# Patient Record
Sex: FEMALE | Race: WHITE | Employment: UNEMPLOYED | URBAN - METROPOLITAN AREA
[De-identification: names, ages, dates, MRNs, and addresses within clinical notes are randomized per-mention and may not be internally consistent; named-entity substitution may affect disease eponyms.]

---

## 2021-10-04 ENCOUNTER — OFFICE VISIT (OUTPATIENT)
Dept: ENDOCRINOLOGY | Age: 36
End: 2021-10-04
Payer: COMMERCIAL

## 2021-10-04 VITALS
OXYGEN SATURATION: 96 % | BODY MASS INDEX: 35.82 KG/M2 | HEART RATE: 81 BPM | HEIGHT: 65 IN | WEIGHT: 215 LBS | DIASTOLIC BLOOD PRESSURE: 81 MMHG | SYSTOLIC BLOOD PRESSURE: 115 MMHG

## 2021-10-04 DIAGNOSIS — E04.9 GOITER: ICD-10-CM

## 2021-10-04 DIAGNOSIS — R53.83 FATIGUE, UNSPECIFIED TYPE: ICD-10-CM

## 2021-10-04 DIAGNOSIS — E88.81 INSULIN RESISTANCE: ICD-10-CM

## 2021-10-04 DIAGNOSIS — E03.9 HYPOTHYROIDISM, UNSPECIFIED TYPE: Primary | ICD-10-CM

## 2021-10-04 PROCEDURE — 99203 OFFICE O/P NEW LOW 30 MIN: CPT | Performed by: INTERNAL MEDICINE

## 2021-10-04 RX ORDER — LEVOTHYROXINE SODIUM 175 MCG
175 TABLET ORAL DAILY
Qty: 30 TABLET | Refills: 3 | Status: SHIPPED | OUTPATIENT
Start: 2021-10-04 | End: 2022-02-28

## 2021-10-04 RX ORDER — BUPROPION HYDROCHLORIDE 300 MG/1
300 TABLET ORAL DAILY
COMMUNITY
End: 2022-06-08

## 2021-10-04 RX ORDER — LEVOTHYROXINE, LIOTHYRONINE 9.5; 2.25 UG/1; UG/1
15 TABLET ORAL DAILY
COMMUNITY
Start: 2021-09-24 | End: 2021-10-04 | Stop reason: ALTCHOICE

## 2021-10-04 RX ORDER — LEVOTHYROXINE, LIOTHYRONINE 57; 13.5 UG/1; UG/1
90 TABLET ORAL DAILY
COMMUNITY
Start: 2021-09-23 | End: 2021-10-04 | Stop reason: ALTCHOICE

## 2021-10-04 RX ORDER — ESCITALOPRAM OXALATE 10 MG/1
10 TABLET ORAL DAILY
COMMUNITY
Start: 2021-07-26 | End: 2022-06-08

## 2021-10-04 RX ORDER — VALACYCLOVIR HYDROCHLORIDE 500 MG/1
500 TABLET, FILM COATED ORAL DAILY PRN
COMMUNITY
Start: 2021-08-22 | End: 2022-10-18 | Stop reason: SDUPTHER

## 2021-10-04 ASSESSMENT — ENCOUNTER SYMPTOMS: CONSTIPATION: 1

## 2021-10-04 NOTE — PROGRESS NOTES
10/4/2021    Assessment:       Diagnosis Orders   1. Hypothyroidism, unspecified type  TSH with Reflex    T4, Free   2. Goiter  US HEAD NECK SOFT TISSUE THYROID   3. Insulin resistance  Insulin, Total   4.  Fatigue, unspecified type  Cortisol Total         PLAN:     Orders Placed This Encounter   Procedures    US HEAD NECK SOFT TISSUE THYROID     Standing Status:   Future     Standing Expiration Date:   10/4/2022    TSH with Reflex     Standing Status:   Future     Standing Expiration Date:   10/4/2022    T4, Free     Standing Status:   Future     Standing Expiration Date:   10/4/2022    Cortisol Total     Standing Status:   Future     Standing Expiration Date:   10/4/2022     Order Specific Question:   8AM or 4PM?     Answer:   random    Insulin, Total     Standing Status:   Future     Standing Expiration Date:   10/4/2022     Increased dose of Synthroid to 175 mcg daily  More than 50% of 30-minute spent patient education counseling thank you for the referral  Orders Placed This Encounter   Medications    SYNTHROID 175 MCG tablet     Sig: Take 1 tablet by mouth Daily     Dispense:  30 tablet     Refill:  3       Subjective:     Chief Complaint   Patient presents with    New Patient    Thyroid Problem     Vitals:    10/04/21 0919   BP: 115/81   Pulse: 81   SpO2: 96%   Weight: 215 lb (97.5 kg)   Height: 5' 5\" (1.651 m)     Wt Readings from Last 3 Encounters:   10/04/21 215 lb (97.5 kg)     BP Readings from Last 3 Encounters:   10/04/21 115/81     Patient self-referred here for hypothyroidism has had hypothyroidism for many years Synthroid brand 150 mcg daily that was switched to nature thyroid which was being adjusted currently is 105 mg of major thyroid patient still complains of fatigue does have excessive sweating and also constipation  Labs reviewed to previous facility in [place] thyroid antibodies were at the upper end of normal no thyroid ultrasound to review last TSH was 2.7 Free T4 was 0.7 patient does complain of increasing weight  Other  This is a new (Hypothyroidism) problem. The current episode started more than 1 year ago. The problem occurs intermittently. The problem has been waxing and waning. Associated symptoms include diaphoresis and fatigue. Nothing aggravates the symptoms. Treatments tried: Synthroid. The treatment provided moderate relief. History reviewed. No pertinent past medical history. History reviewed. No pertinent surgical history. Social History     Socioeconomic History    Marital status:      Spouse name: Not on file    Number of children: Not on file    Years of education: Not on file    Highest education level: Not on file   Occupational History    Not on file   Tobacco Use    Smoking status: Never Smoker    Smokeless tobacco: Never Used   Substance and Sexual Activity    Alcohol use: Not on file    Drug use: Not on file    Sexual activity: Not on file   Other Topics Concern    Not on file   Social History Narrative    Not on file     Social Determinants of Health     Financial Resource Strain:     Difficulty of Paying Living Expenses:    Food Insecurity:     Worried About Running Out of Food in the Last Year:     [address] in the Last Year:    Transportation Needs:     Lack of Transportation (Medical):  Lack of Transportation (Non-Medical):    Physical Activity:     Days of Exercise per Week:     Minutes of Exercise per Session:    Stress:     Feeling of Stress :    Social Connections:     Frequency of Communication with Friends and Family:     Frequency of Social Gatherings with Friends and Family:     Attends Hinduism Services:     Active Member of Clubs or Organizations:     Attends Club or Organization Meetings:     Marital Status:    Intimate Partner Violence:     Fear of Current or Ex-Partner:     Emotionally Abused:     Physically Abused:     Sexually Abused:      History reviewed. No pertinent family history.   Allergies Allergen Reactions    Macrobid [Nitrofurantoin] Other (See Comments)       Current Outpatient Medications:     buPROPion (WELLBUTRIN XL) 300 MG extended release tablet, Take 300 mg by mouth daily, Disp: , Rfl:     escitalopram (LEXAPRO) 10 MG tablet, Take 10 mg by mouth daily 0ne and half tab daily, Disp: , Rfl:     NP THYROID 15 MG tablet, Take 15 mg by mouth daily, Disp: , Rfl:     NP THYROID 90 MG tablet, Take 90 mg by mouth daily, Disp: , Rfl:     valACYclovir (VALTREX) 500 MG tablet, Take 500 mg by mouth daily, Disp: , Rfl:   No results found for: NA, K, CL, CO2, BUN, CREATININE, GLUCOSE, CALCIUM, PROT, LABALBU, BILITOT, ALKPHOS, AST, ALT, LABGLOM, GFRAA, AGRATIO, GLOB  No results found for: WBC, HGB, HCT, MCV, PLT  No results found for: LABA1C  No results found for: CHOLFAST, TRIGLYCFAST, HDL, LDLCALC, CHOL, TRIG  No results found for: TESTM  No results found for: TSH, TSHREFLEX, FT3, T4FREE  No results found for: TPOABS    Review of Systems   Constitutional: Positive for diaphoresis, fatigue and unexpected weight change. Eyes: Negative. Cardiovascular: Negative. Gastrointestinal: Positive for constipation. Endocrine: Negative. Genitourinary: Negative. Psychiatric/Behavioral: Negative. All other systems reviewed and are negative. Objective:   Physical Exam  Vitals reviewed. Constitutional:       General: She is not in acute distress. Appearance: Normal appearance. She is obese. HENT:      Head: Normocephalic and atraumatic. Hair is normal.      Right Ear: External ear normal.      Left Ear: External ear normal.      Nose: Nose normal.      Mouth/Throat:      Mouth: Mucous membranes are moist.   Eyes:      General: No scleral icterus. Right eye: No discharge. Left eye: No discharge. Extraocular Movements: Extraocular movements intact. Conjunctiva/sclera: Conjunctivae normal.   Neck:      Thyroid: Thyromegaly present.       Trachea: Trachea normal. Cardiovascular:      Rate and Rhythm: Normal rate and regular rhythm. Pulmonary:      Effort: Pulmonary effort is normal.      Breath sounds: Normal breath sounds. No wheezing or rhonchi. Abdominal:      Palpations: Abdomen is soft. Musculoskeletal:         General: Normal range of motion. Cervical back: Normal range of motion and neck supple. Skin:     General: Skin is warm and dry. Neurological:      General: No focal deficit present. Mental Status: She is alert and oriented to person, place, and time.    Psychiatric:         Mood and Affect: Mood normal.         Behavior: Behavior normal.

## 2021-10-10 ASSESSMENT — ENCOUNTER SYMPTOMS: EYES NEGATIVE: 1

## 2021-10-12 ENCOUNTER — HOSPITAL ENCOUNTER (OUTPATIENT)
Dept: ULTRASOUND IMAGING | Age: 36
Discharge: HOME | End: 2021-10-14
Payer: COMMERCIAL

## 2021-10-12 DIAGNOSIS — E04.9 GOITER: ICD-10-CM

## 2021-10-12 PROCEDURE — 76536 US EXAM OF HEAD AND NECK: CPT

## 2021-12-03 DIAGNOSIS — E03.9 HYPOTHYROIDISM, UNSPECIFIED TYPE: ICD-10-CM

## 2021-12-03 DIAGNOSIS — R53.83 FATIGUE, UNSPECIFIED TYPE: ICD-10-CM

## 2021-12-03 DIAGNOSIS — E88.819 INSULIN RESISTANCE: ICD-10-CM

## 2021-12-03 LAB
CORTISOL TOTAL: 9 UG/DL
INSULIN: 7.1 UIU/ML (ref 2.6–24.9)
T4 FREE: 1.57 NG/DL (ref 0.84–1.68)
TSH REFLEX: 0.96 UIU/ML (ref 0.44–3.86)

## 2021-12-06 ENCOUNTER — OFFICE VISIT (OUTPATIENT)
Dept: ENDOCRINOLOGY | Age: 36
End: 2021-12-06
Payer: COMMERCIAL

## 2021-12-06 VITALS
WEIGHT: 219 LBS | DIASTOLIC BLOOD PRESSURE: 84 MMHG | OXYGEN SATURATION: 97 % | SYSTOLIC BLOOD PRESSURE: 127 MMHG | HEIGHT: 65 IN | BODY MASS INDEX: 36.49 KG/M2 | HEART RATE: 87 BPM

## 2021-12-06 DIAGNOSIS — R63.5 WEIGHT GAIN: ICD-10-CM

## 2021-12-06 DIAGNOSIS — E03.9 HYPOTHYROIDISM, UNSPECIFIED TYPE: Primary | ICD-10-CM

## 2021-12-06 DIAGNOSIS — R53.83 FATIGUE, UNSPECIFIED TYPE: ICD-10-CM

## 2021-12-06 PROCEDURE — 99213 OFFICE O/P EST LOW 20 MIN: CPT | Performed by: INTERNAL MEDICINE

## 2021-12-06 NOTE — PROGRESS NOTES
12/6/2021    Assessment:       Diagnosis Orders   1. Hypothyroidism, unspecified type  T4, Free    TSH without Reflex   2. Weight gain     3. Fatigue, unspecified type  Vitamin B12 & Folate    Vitamin D 25 Hydroxy    Basic Metabolic Panel         PLAN:     Orders Placed This Encounter   Procedures    T4, Free     Standing Status:   Future     Standing Expiration Date:   12/6/2022    TSH without Reflex     Standing Status:   Future     Standing Expiration Date:   12/6/2022    Vitamin B12 & Folate     Standing Status:   Future     Standing Expiration Date:   12/6/2022    Vitamin D 25 Hydroxy     Standing Status:   Future     Standing Expiration Date:   12/6/2022    Basic Metabolic Panel     Standing Status:   Future     Standing Expiration Date:   12/6/2022     Continue Synthroid 175 mcg daily    Subjective:     Chief Complaint   Patient presents with    Follow-up     Hypothyroidism, unspecified type     Vitals:    12/06/21 0909   BP: 127/84   Pulse: 87   SpO2: 97%   Weight: 219 lb (99.3 kg)   Height: 5' 5\" (1.651 m)     Wt Readings from Last 3 Encounters:   12/06/21 219 lb (99.3 kg)   10/04/21 215 lb (97.5 kg)     BP Readings from Last 3 Encounters:   12/06/21 127/84   10/04/21 115/81     Follow-up on hypothyroidism patient on Synthroid 175 mcg daily  Labs were reviewed stable cortisol was normal thyroid ultrasound was also normal reviewed with patient    Other  This is a chronic (Hypothyroidism) problem. The current episode started more than 1 month ago. The problem occurs intermittently. The problem has been unchanged. Associated symptoms include fatigue. Treatments tried: Synthroid. The treatment provided moderate relief.           THYROID ULTRASOUND       CLINICAL HISTORY: Hypothyroidism, goiter       COMPARISON: None.       TECHNIQUE: Sonography and Doppler imaging of the thyroid was performed.  Images were obtained and stored in a permanent archive.       RESULT:    RIGHT LOBE:         Size: 2.2 x 1.0 x Smoking status: Never Smoker    Smokeless tobacco: Never Used   Substance and Sexual Activity    Alcohol use: Not on file    Drug use: Not on file    Sexual activity: Not on file   Other Topics Concern    Not on file   Social History Narrative    Not on file     Social Determinants of Health     Financial Resource Strain:     Difficulty of Paying Living Expenses: Not on file   Food Insecurity:     Worried About Running Out of Food in the Last Year: Not on file    Jose of Food in the Last Year: Not on file   Transportation Needs:     Lack of Transportation (Medical): Not on file    Lack of Transportation (Non-Medical): Not on file   Physical Activity:     Days of Exercise per Week: Not on file    Minutes of Exercise per Session: Not on file   Stress:     Feeling of Stress : Not on file   Social Connections:     Frequency of Communication with Friends and Family: Not on file    Frequency of Social Gatherings with Friends and Family: Not on file    Attends Ca[employer]ism Services: Not on file    Active Member of [address] [place] [address] or Organizations: Not on file    Attends Club or Organization Meetings: Not on file    Marital Status: Not on file   Intimate Partner Violence:     Fear of Current or Ex-Partner: Not on file    Emotionally Abused: Not on file    Physically Abused: Not on file    Sexually Abused: Not on file   Housing Stability:     Unable to Pay for Housing in the Last Year: Not on file    Number of [place] in the Last Year: Not on file    Unstable Housing in the Last Year: Not on file     No family history on file.   Allergies   Allergen Reactions    Macrobid [Nitrofurantoin] Other (See Comments)       Current Outpatient Medications:     buPROPion (WELLBUTRIN XL) 300 MG extended release tablet, Take 300 mg by mouth daily, Disp: , Rfl:     escitalopram (LEXAPRO) 10 MG tablet, Take 10 mg by mouth daily 0ne and half tab daily, Disp: , Rfl:     valACYclovir (VALTREX) 500 MG tablet, Take 500 mg by mouth daily, Disp: , Rfl:     SYNTHROID 175 MCG tablet, Take 1 tablet by mouth Daily, Disp: 30 tablet, Rfl: 3  No results found for: NA, K, CL, CO2, BUN, CREATININE, GLUCOSE, CALCIUM, PROT, LABALBU, BILITOT, ALKPHOS, AST, ALT, LABGLOM, GFRAA, AGRATIO, GLOB  No results found for: WBC, HGB, HCT, MCV, PLT  No results found for: LABA1C  No results found for: CHOLFAST, TRIGLYCFAST, HDL, LDLCALC, CHOL, TRIG  No results found for: TESTM  Lab Results   Component Value Date    TSHREFLEX 0.957 12/03/2021    T4FREE 1.57 12/03/2021     No results found for: TPOABS    Review of Systems   Constitutional: Positive for fatigue and unexpected weight change. All other systems reviewed and are negative. Objective:   Physical Exam  Vitals reviewed. Constitutional:       Appearance: Normal appearance. She is obese. HENT:      Head: Normocephalic and atraumatic. Hair is normal.      Right Ear: External ear normal.      Left Ear: External ear normal.      Nose: Nose normal.   Eyes:      General: No scleral icterus. Right eye: No discharge. Left eye: No discharge. Extraocular Movements: Extraocular movements intact. Conjunctiva/sclera: Conjunctivae normal.   Neck:      Trachea: Trachea normal.   Cardiovascular:      Rate and Rhythm: Normal rate. Pulmonary:      Effort: Pulmonary effort is normal.   Musculoskeletal:         General: Normal range of motion. Cervical back: Normal range of motion and neck supple. Neurological:      General: No focal deficit present. Mental Status: She is alert and oriented to person, place, and time.    Psychiatric:         Mood and Affect: Mood normal.         Behavior: Behavior normal.

## 2022-02-28 RX ORDER — LEVOTHYROXINE SODIUM 175 MCG
TABLET ORAL
Qty: 30 TABLET | Refills: 3 | Status: SHIPPED | OUTPATIENT
Start: 2022-02-28 | End: 2022-03-08 | Stop reason: ALTCHOICE

## 2022-03-07 ENCOUNTER — OFFICE VISIT (OUTPATIENT)
Dept: ENDOCRINOLOGY | Age: 37
End: 2022-03-07
Payer: COMMERCIAL

## 2022-03-07 VITALS
DIASTOLIC BLOOD PRESSURE: 80 MMHG | SYSTOLIC BLOOD PRESSURE: 117 MMHG | HEIGHT: 65 IN | HEART RATE: 86 BPM | BODY MASS INDEX: 36.32 KG/M2 | WEIGHT: 218 LBS

## 2022-03-07 DIAGNOSIS — E03.9 HYPOTHYROIDISM, UNSPECIFIED TYPE: ICD-10-CM

## 2022-03-07 DIAGNOSIS — E03.9 HYPOTHYROIDISM, UNSPECIFIED TYPE: Primary | ICD-10-CM

## 2022-03-07 DIAGNOSIS — R53.83 FATIGUE, UNSPECIFIED TYPE: ICD-10-CM

## 2022-03-07 LAB
ANION GAP SERPL CALCULATED.3IONS-SCNC: 15 MEQ/L (ref 9–15)
BUN BLDV-MCNC: 14 MG/DL (ref 6–20)
CALCIUM SERPL-MCNC: 8.8 MG/DL (ref 8.5–9.9)
CHLORIDE BLD-SCNC: 101 MEQ/L (ref 95–107)
CO2: 21 MEQ/L (ref 20–31)
CREAT SERPL-MCNC: 0.69 MG/DL (ref 0.5–0.9)
GFR AFRICAN AMERICAN: >60
GFR NON-AFRICAN AMERICAN: >60
GLUCOSE BLD-MCNC: 96 MG/DL (ref 70–99)
POTASSIUM SERPL-SCNC: 4.3 MEQ/L (ref 3.4–4.9)
SODIUM BLD-SCNC: 137 MEQ/L (ref 135–144)
T4 FREE: 1.4 NG/DL (ref 0.84–1.68)
TSH SERPL DL<=0.05 MIU/L-ACNC: 0.11 UIU/ML (ref 0.44–3.86)

## 2022-03-07 PROCEDURE — 99213 OFFICE O/P EST LOW 20 MIN: CPT | Performed by: INTERNAL MEDICINE

## 2022-03-07 NOTE — PROGRESS NOTES
3/7/2022    Assessment:       Diagnosis Orders   1. Hypothyroidism, unspecified type           PLAN:     Orders Placed This Encounter   Procedures    TSH     Standing Status:   Future     Standing Expiration Date:   3/7/2023    T4, Free     Standing Status:   Future     Standing Expiration Date:   3/7/2023    Thyroid Peroxidase Antibody     Standing Status:   Future     Standing Expiration Date:   3/7/2023     Will lower the dose of levothyroxine to 150 mcg daily because of suppressed TSH    Subjective:     Chief Complaint   Patient presents with    Hypothyroidism     Vitals:    03/07/22 0929   BP: 117/80   Site: Left Upper Arm   Position: Sitting   Cuff Size: Large Adult   Pulse: 86   Weight: 218 lb (98.9 kg)   Height: 5' 5\" (1.651 m)     Wt Readings from Last 3 Encounters:   03/07/22 218 lb (98.9 kg)   12/06/21 219 lb (99.3 kg)   10/04/21 215 lb (97.5 kg)     BP Readings from Last 3 Encounters:   03/07/22 117/80   12/06/21 127/84   10/04/21 115/81     Follow-up on hypothyroidism secondary to Hashimoto thyroiditis labs been reviewed from December and March cortisol level was normal repeat thyroid function test showed increased thyroid antibodies TSH was suppressed    Other  This is a recurrent (Hypothyroidism) problem. The current episode started more than 1 year ago. The problem occurs intermittently. The problem has been waxing and waning. Associated symptoms include fatigue. Exacerbated by: Hashimoto thyroiditis. Treatments tried: Levothyroxine. The treatment provided moderate relief. [id number] as of 3/13/2022 17:48   Ref.  Range 12/3/2021 11:27 3/7/2022 10:17   Sodium Latest Ref Range: 135 - 144 mEq/L  137   Potassium Latest Ref Range: 3.4 - 4.9 mEq/L  4.3   Chloride Latest Ref Range: 95 - 107 mEq/L  101   CO2 Latest Ref Range: 20 - 31 mEq/L  21   BUN,BUNPL Latest Ref Range: 6 - 20 mg/dL  14   Creatinine Latest Ref Range: 0.50 - 0.90 mg/dL  0.69   [place] Latest Active Member of Clubs or Organizations: Not on file    Attends Club or Organization Meetings: Not on file    Marital Status: Not on file   Intimate Partner Violence:     Fear of Current or Ex-Partner: Not on file    Emotionally Abused: Not on file    Physically Abused: Not on file    Sexually Abused: Not on file   Housing Stability:     Unable to Pay for Housing in the Last Year: Not on file    Number of [place] in the Last Year: Not on file    Unstable Housing in the Last Year: Not on file     History reviewed. No pertinent family history. Allergies   Allergen Reactions    Macrobid [Nitrofurantoin] Other (See Comments)       Current Outpatient Medications:     SYNTHROID 175 MCG tablet, TAKE 1 TABLET BY MOUTH EVERY DAY, Disp: 30 tablet, Rfl: 3    buPROPion (WELLBUTRIN XL) 300 MG extended release tablet, Take 300 mg by mouth daily, Disp: , Rfl:     escitalopram (LEXAPRO) 10 MG tablet, Take 10 mg by mouth daily 0ne and half tab daily, Disp: , Rfl:     valACYclovir (VALTREX) 500 MG tablet, Take 500 mg by mouth daily, Disp: , Rfl:   No results found for: NA, K, CL, CO2, BUN, CREATININE, GLUCOSE, CALCIUM, PROT, LABALBU, BILITOT, ALKPHOS, AST, ALT, LABGLOM, GFRAA, AGRATIO, GLOB  No results found for: WBC, HGB, HCT, MCV, PLT  No results found for: LABA1C  No results found for: CHOLFAST, TRIGLYCFAST, HDL, LDLCALC, CHOL, TRIG  No results found for: TESTM  Lab Results   Component Value Date    TSHREFLEX 0.957 12/03/2021    T4FREE 1.57 12/03/2021     No results found for: TPOABS    Review of Systems   Constitutional: Positive for fatigue. Endocrine: Negative. All other systems reviewed and are negative. Objective:   Physical Exam  Vitals reviewed. Constitutional:       Appearance: Normal appearance. She is obese. HENT:      Head: Normocephalic and atraumatic.       Right Ear: External ear normal.      Left Ear: External ear normal.      Nose: Nose normal.   Eyes:      General: No scleral icterus. Right eye: No discharge. Left eye: No discharge. Extraocular Movements: Extraocular movements intact. Conjunctiva/sclera: Conjunctivae normal.   Cardiovascular:      Rate and Rhythm: Normal rate. Pulmonary:      Effort: Pulmonary effort is normal.   Musculoskeletal:         General: Normal range of motion. Cervical back: Normal range of motion and neck supple. Neurological:      General: No focal deficit present. Mental Status: She is alert and oriented to person, place, and time.    Psychiatric:         Mood and Affect: Mood normal.         Behavior: Behavior normal.

## 2022-03-08 ENCOUNTER — TELEPHONE (OUTPATIENT)
Dept: ENDOCRINOLOGY | Age: 37
End: 2022-03-08

## 2022-03-08 RX ORDER — LEVOTHYROXINE SODIUM 150 MCG
150 TABLET ORAL DAILY
Qty: 30 TABLET | Refills: 3 | Status: SHIPPED | OUTPATIENT
Start: 2022-03-08 | End: 2022-10-18 | Stop reason: SDUPTHER

## 2022-03-08 RX ORDER — LEVOTHYROXINE SODIUM 0.15 MG/1
150 TABLET ORAL DAILY
Qty: 30 TABLET | Refills: 5 | Status: SHIPPED | OUTPATIENT
Start: 2022-03-08 | End: 2022-06-08 | Stop reason: SDUPTHER

## 2022-03-08 NOTE — TELEPHONE ENCOUNTER
Pt takes name brand synthroid, you had sent over LEV levothyroxine.   Pharmacy called stating they need a new script sent over for LEV synthroid

## 2022-03-09 LAB — THYROID PEROXIDASE (TPO) ABS: 36 IU/ML (ref 0–25)

## 2022-06-07 DIAGNOSIS — E03.9 HYPOTHYROIDISM, UNSPECIFIED TYPE: ICD-10-CM

## 2022-06-07 DIAGNOSIS — R53.83 FATIGUE, UNSPECIFIED TYPE: ICD-10-CM

## 2022-06-07 LAB
T4 FREE: 1.26 NG/DL (ref 0.84–1.68)
TSH SERPL DL<=0.05 MIU/L-ACNC: 7 UIU/ML (ref 0.44–3.86)

## 2022-06-08 ENCOUNTER — OFFICE VISIT (OUTPATIENT)
Dept: ENDOCRINOLOGY | Age: 37
End: 2022-06-08
Payer: COMMERCIAL

## 2022-06-08 VITALS
WEIGHT: 221.2 LBS | OXYGEN SATURATION: 96 % | HEART RATE: 79 BPM | DIASTOLIC BLOOD PRESSURE: 78 MMHG | HEIGHT: 65 IN | BODY MASS INDEX: 36.85 KG/M2 | SYSTOLIC BLOOD PRESSURE: 123 MMHG

## 2022-06-08 DIAGNOSIS — E03.9 HYPOTHYROIDISM, UNSPECIFIED TYPE: Primary | ICD-10-CM

## 2022-06-08 DIAGNOSIS — R53.83 FATIGUE, UNSPECIFIED TYPE: ICD-10-CM

## 2022-06-08 LAB
FOLATE: 14.2 NG/ML
HOMOCYSTEINE: 8.3 UMOL/L
IRON SATURATION: 11 % (ref 20–55)
IRON: 57 UG/DL (ref 37–145)
TOTAL IRON BINDING CAPACITY: 510 UG/DL (ref 250–450)
UNSATURATED IRON BINDING CAPACITY: 453 UG/DL (ref 112–347)
VITAMIN B-12: 384 PG/ML (ref 232–1245)
VITAMIN D 25-HYDROXY: 22.6 NG/ML

## 2022-06-08 PROCEDURE — 99213 OFFICE O/P EST LOW 20 MIN: CPT | Performed by: INTERNAL MEDICINE

## 2022-06-08 RX ORDER — LEVOTHYROXINE SODIUM 175 MCG
TABLET ORAL
Qty: 30 TABLET | Refills: 5 | Status: SHIPPED | OUTPATIENT
Start: 2022-06-08 | End: 2022-08-10 | Stop reason: SDUPTHER

## 2022-06-08 NOTE — PROGRESS NOTES
6/8/2022    Assessment:       Diagnosis Orders   1. Hypothyroidism, unspecified type  TSH    T4, Free   2. Fatigue, unspecified type  Methylmalonic Acid, Serum    Copper, Serum    Zinc    Iron and TIBC    Homocysteine         PLAN:     Increase Synthroid dose to 175 mcg brand labs to be done for copper MMA zinc iron level patient also to follow-up with functional medicine for diet changes  Orders Placed This Encounter   Procedures    TSH     Standing Status:   Future     Standing Expiration Date:   6/8/2023    T4, Free     Standing Status:   Future     Standing Expiration Date:   6/8/2023    Methylmalonic Acid, Serum     Standing Status:   Future     Standing Expiration Date:   6/8/2023    Copper, Serum     Standing Status:   Future     Standing Expiration Date:   6/8/2023    Zinc     Standing Status:   Future     Standing Expiration Date:   6/8/2023    Iron and TIBC     Standing Status:   Future     Standing Expiration Date:   6/8/2023     Order Specific Question:   Is Patient Fasting? Answer:   YES     Order Specific Question:   No of Hours?      Answer:   12    Homocysteine     Standing Status:   Future     Standing Expiration Date:   6/8/2023       Subjective:     Chief Complaint   Patient presents with    Hypothyroidism     Vitals:    06/08/22 0928   BP: 123/78   Site: Left Upper Arm   Position: Sitting   Cuff Size: Medium Adult   Pulse: 79   SpO2: 96%   Weight: 221 lb 3.2 oz (100.3 kg)   Height: 5' 5\" (1.651 m)     Wt Readings from Last 3 Encounters:   06/08/22 221 lb 3.2 oz (100.3 kg)   03/07/22 218 lb (98.9 kg)   12/06/21 219 lb (99.3 kg)     BP Readings from Last 3 Encounters:   06/08/22 123/78   03/07/22 117/80   12/06/21 127/84     Follow-up on hypothyroidism obesity patient also has not been able to lose weight complaints of fatigue plan to see a functional nutritional medicine last TSH was elevated currently under 150 mcg of brand Synthroid requesting a trace element labs    Other  This is a recurrent (Hypothyroidism) problem. The current episode started more than 1 year ago. The problem has been waxing and waning. Associated symptoms include fatigue. Treatments tried: Synthroid brand. The treatment provided moderate relief. No past medical history on file. No past surgical history on file. Social History     Socioeconomic History    Marital status:      Spouse name: Not on file    Number of children: Not on file    Years of education: Not on file    Highest education level: Not on file   Occupational History    Not on file   Tobacco Use    Smoking status: Never Smoker    Smokeless tobacco: Never Used   Substance and Sexual Activity    Alcohol use: Not on file    Drug use: Not on file    Sexual activity: Not on file   Other Topics Concern    Not on file   Social History Narrative    Not on file     Social Determinants of Health     Financial Resource Strain:     Difficulty of Paying Living Expenses: Not on file   Food Insecurity:     Worried About Running Out of Food in the Last Year: Not on file    Jose of Food in the Last Year: Not on file   Transportation Needs:     Lack of Transportation (Medical): Not on file    Lack of Transportation (Non-Medical):  Not on file   Physical Activity:     Days of Exercise per Week: Not on file    Minutes of Exercise per Session: Not on file   Stress:     Feeling of Stress : Not on file   Social Connections:     Frequency of Communication with Friends and Family: Not on file    Frequency of Social Gatherings with Friends and Family: Not on file    Attends [employer] Services: Not on file    Active Member of Clubs or Organizations: Not on file    Attends Club or Organization Meetings: Not on file    Marital Status: Not on file   Intimate Partner Violence:     Fear of Current or Ex-Partner: Not on file    Emotionally Abused: Not on file    Physically Abused: Not on file    Sexually Abused: Not on file   Housing Stability:     Unable to Pay for Housing in the Last Year: Not on file    Number of Places Lived in the Last Year: Not on file    Unstable Housing in the Last Year: Not on file     No family history on file. Allergies   Allergen Reactions    Macrobid [Nitrofurantoin] Other (See Comments)       Current Outpatient Medications:     aluminum chloride (DRYSOL) 20 % external solution, Drysol Dab-O-Matic 20 % topical solution, Disp: , Rfl:     SYNTHROID 150 MCG tablet, Take 1 tablet by mouth Daily, Disp: 30 tablet, Rfl: 3    valACYclovir (VALTREX) 500 MG tablet, Take 500 mg by mouth daily as needed , Disp: , Rfl:   Lab Results   Component Value Date     03/07/2022    K 4.3 03/07/2022     03/07/2022    CO2 21 03/07/2022    BUN 14 03/07/2022    CREATININE 0.69 03/07/2022    GLUCOSE 96 03/07/2022    CALCIUM 8.8 03/07/2022    LABGLOM >60.0 03/07/2022    GFRAA >60.0 03/07/2022     No results found for: WBC, HGB, HCT, MCV, PLT  No results found for: LABA1C  No results found for: CHOLFAST, TRIGLYCFAST, HDL, LDLCALC, CHOL, TRIG  No results found for: TESTM  Lab Results   Component Value Date    TSH 7.000 (H) 06/07/2022    TSH 0.107 (L) 03/07/2022    TSHREFLEX 0.957 12/03/2021    T4FREE 1.26 06/07/2022    T4FREE 1.40 03/07/2022    T4FREE 1.57 12/03/2021     Lab Results   Component Value Date    TPOABS 36.0 (H) 03/07/2022       Review of Systems   Constitutional: Positive for fatigue and unexpected weight change. Cardiovascular: Negative. Endocrine: Negative. All other systems reviewed and are negative. Objective:   Physical Exam  Vitals reviewed. Constitutional:       General: She is not in acute distress. Appearance: Normal appearance. She is obese. HENT:      Head: Normocephalic and atraumatic. Right Ear: External ear normal.      Left Ear: External ear normal.      Nose: Nose normal.   Eyes:      General: No scleral icterus. Right eye: No discharge. Left eye: No discharge. Extraocular Movements: Extraocular movements intact. Conjunctiva/sclera: Conjunctivae normal.   Cardiovascular:      Rate and Rhythm: Normal rate. Pulmonary:      Effort: Pulmonary effort is normal.   Musculoskeletal:         General: Normal range of motion. Cervical back: Normal range of motion and neck supple. Neurological:      General: No focal deficit present. Mental Status: She is alert and oriented to person, place, and time.    Psychiatric:         Mood and Affect: Mood normal.         Behavior: Behavior normal.

## 2022-06-11 LAB — METHYLMALONIC ACID: 0.12 UMOL/L (ref 0–0.4)

## 2022-06-12 LAB
COPPER: 132.1 UG/DL (ref 80–155)
ZINC: 80.7 UG/DL (ref 60–120)

## 2022-08-09 DIAGNOSIS — E03.9 HYPOTHYROIDISM, UNSPECIFIED TYPE: ICD-10-CM

## 2022-08-09 LAB — TSH SERPL DL<=0.05 MIU/L-ACNC: 0.03 UIU/ML (ref 0.44–3.86)

## 2022-08-10 ENCOUNTER — OFFICE VISIT (OUTPATIENT)
Dept: ENDOCRINOLOGY | Age: 37
End: 2022-08-10
Payer: COMMERCIAL

## 2022-08-10 VITALS
WEIGHT: 217 LBS | HEART RATE: 98 BPM | OXYGEN SATURATION: 97 % | SYSTOLIC BLOOD PRESSURE: 115 MMHG | DIASTOLIC BLOOD PRESSURE: 83 MMHG | HEIGHT: 65 IN | BODY MASS INDEX: 36.15 KG/M2

## 2022-08-10 DIAGNOSIS — E03.9 HYPOTHYROIDISM, UNSPECIFIED TYPE: Primary | ICD-10-CM

## 2022-08-10 LAB — T4 FREE: 1.73 NG/DL (ref 0.84–1.68)

## 2022-08-10 PROCEDURE — 99214 OFFICE O/P EST MOD 30 MIN: CPT | Performed by: PHYSICIAN ASSISTANT

## 2022-08-10 RX ORDER — LEVOTHYROXINE SODIUM 0.15 MG/1
TABLET ORAL
Qty: 40 TABLET | Refills: 3 | Status: SHIPPED | OUTPATIENT
Start: 2022-08-10 | End: 2022-10-18 | Stop reason: SDUPTHER

## 2022-08-10 RX ORDER — LEVOTHYROXINE SODIUM 175 MCG
TABLET ORAL
Qty: 60 TABLET | Refills: 5 | Status: SHIPPED | OUTPATIENT
Start: 2022-08-10 | End: 2022-10-18 | Stop reason: SDUPTHER

## 2022-08-10 ASSESSMENT — ENCOUNTER SYMPTOMS
COUGH: 0
VOMITING: 0
EYE REDNESS: 0
RHINORRHEA: 0
NAUSEA: 0
SHORTNESS OF BREATH: 0
ABDOMINAL PAIN: 0
WHEEZING: 0
SORE THROAT: 0
EYE PAIN: 0
DIARRHEA: 0
SINUS PRESSURE: 0

## 2022-08-10 NOTE — PROGRESS NOTES
8/10/2022    Assessment:       Diagnosis Orders   1. Hypothyroidism, unspecified type  TSH    T4, Free    T3, Free          PLAN:     Levothyroxine 175 mcg Mon-Fri, 150 mcg Sat-Sun  Repeat labs in 2 months   Follow up in 2 months       Orders Placed This Encounter   Procedures    TSH     Standing Status:   Future     Standing Expiration Date:   8/10/2023    T4, Free     Standing Status:   Future     Standing Expiration Date:   8/10/2023    T3, Free     Standing Status:   Future     Standing Expiration Date:   8/10/2023     Orders Placed This Encounter   Medications    levothyroxine (SYNTHROID) 150 MCG tablet     Sig: Take one tablet Saturday and Sunday     Dispense:  40 tablet     Refill:  3    SYNTHROID 175 MCG tablet     Sig: TAKE 1 TABLET BY MOUTH Monday-Friday, ONLY  DAW     Dispense:  60 tablet     Refill:  5     Return in about 2 months (around 10/10/2022) for Thyroid. Subjective:     Chief Complaint   Patient presents with    Hypothyroidism     Vitals:    08/10/22 1005   BP: 115/83   Site: Left Upper Arm   Position: Sitting   Cuff Size: Large Adult   Pulse: 98   SpO2: 97%   Weight: 217 lb (98.4 kg)   Height: 5' 5\" (1.651 m)     Wt Readings from Last 3 Encounters:   08/10/22 217 lb (98.4 kg)   06/08/22 221 lb 3.2 oz (100.3 kg)   03/07/22 218 lb (98.9 kg)     BP Readings from Last 3 Encounters:   08/10/22 115/83   06/08/22 123/78   03/07/22 117/80     Erasmo Boyer is a 69-year-old female with a history of hypothyroidism since she was 8years old. She was positive for Hashimoto's thyroiditis and has had difficulty maintaining stable thyroid labs for the last few years. She has been experiencing difficulty sleeping, and difficulty losing weight. Previous endocrinologist tried her on Cytomel, transitioned her to [place] Thyroid, and then she was placed back on levothyroxine. Her recent TSH is suppressed, I am making adjustments to her dosing regiment slowly and will monitor serial thyroid labs accordingly.   I may consider adding Cytomel to her regiment based on her free T3 levels once her TSH is in normal range. No past medical history on file. No past surgical history on file. Social History     Socioeconomic History    Marital status:      Spouse name: Not on file    Number of children: Not on file    Years of education: Not on file    Highest education level: Not on file   Occupational History    Not on file   Tobacco Use    Smoking status: Never    Smokeless tobacco: Never   Substance and Sexual Activity    Alcohol use: Not on file    Drug use: Not on file    Sexual activity: Not on file   Other Topics Concern    Not on file   Social History Narrative    Not on file     Social Determinants of Health     Financial Resource Strain: Not on file   Food Insecurity: Not on file   Transportation Needs: Not on file   Physical Activity: Not on file   Stress: Not on file   Social Connections: Not on file   Intimate Partner Violence: Not on file   Housing Stability: Not on file     No family history on file.   Allergies   Allergen Reactions    Macrobid [Nitrofurantoin] Other (See Comments)       Current Outpatient Medications:     levothyroxine (SYNTHROID) 150 MCG tablet, Take one tablet Saturday and Sunday, Disp: 40 tablet, Rfl: 3    SYNTHROID 175 MCG tablet, TAKE 1 TABLET BY MOUTH Monday-Friday, ONLY  LEV, Disp: 60 tablet, Rfl: 5    aluminum chloride (DRYSOL) 20 % external solution, Drysol Dab-O-Matic 20 % topical solution, Disp: , Rfl:     SYNTHROID 150 MCG tablet, Take 1 tablet by mouth Daily, Disp: 30 tablet, Rfl: 3    valACYclovir (VALTREX) 500 MG tablet, Take 500 mg by mouth daily as needed , Disp: , Rfl:   Lab Results   Component Value Date     03/07/2022    K 4.3 03/07/2022     03/07/2022    CO2 21 03/07/2022    BUN 14 03/07/2022    CREATININE 0.69 03/07/2022    GLUCOSE 96 03/07/2022    CALCIUM 8.8 03/07/2022    LABGLOM >60.0 03/07/2022    GFRAA >60.0 03/07/2022     No results found for: WBC, HGB, HCT, MCV, PLT  No results found for: LABA1C  No results found for: CHOLFAST, TRIGLYCFAST, HDL, LDLCALC, CHOL, TRIG  No results found for: TESTOSTERONE, SHBG, TESTFREENM  Lab Results   Component Value Date    TSH 0.027 (L) 08/09/2022    TSH 7.000 (H) 06/07/2022    TSH 0.107 (L) 03/07/2022    TSHREFLEX 0.957 12/03/2021    T4FREE 1.26 06/07/2022    T4FREE 1.40 03/07/2022    T4FREE 1.57 12/03/2021     Lab Results   Component Value Date    TPOABS 36.0 (H) 03/07/2022     10/12/2022  THYROID ULTRASOUND       CLINICAL HISTORY: Hypothyroidism, goiter       COMPARISON: None. TECHNIQUE: Sonography and Doppler imaging of the thyroid was performed. Images were obtained and stored in a permanent archive. RESULT:    RIGHT LOBE:         Size: 2.2 x 1.0 x 0.9 cm          Echotexture: Heterogeneous        Nodules: None. LEFT LOBE:       Size: 3.1 x 1.0 x 1.0 cm        Echotexture: Heterogeneous       Nodules: None. ISTHMUS:          AP diameter:  3 mm        Nodules: None.       =============       Impression       Small heterogeneous thyroid gland without evidence of dominant nodule. Review of Systems   Constitutional:  Negative for chills, fatigue and fever. HENT:  Negative for congestion, ear pain, postnasal drip, rhinorrhea, sinus pressure and sore throat. Eyes:  Negative for pain and redness. Respiratory:  Negative for cough, shortness of breath and wheezing. Cardiovascular:  Negative for chest pain, palpitations and leg swelling. Gastrointestinal:  Negative for abdominal pain, diarrhea, nausea and vomiting. Genitourinary:  Negative for difficulty urinating. Musculoskeletal:  Negative for arthralgias. Skin:  Negative for rash. Neurological:  Negative for dizziness and headaches. Objective:   Physical Exam  Constitutional:       Appearance: Normal appearance. She is well-developed. She is not ill-appearing. HENT:      Head: Normocephalic and atraumatic.       Nose: No congestion. Eyes:      Conjunctiva/sclera: Conjunctivae normal.   Neck:      Comments: No thyromegaly or palpable nodules  Cardiovascular:      Rate and Rhythm: Normal rate and regular rhythm. Heart sounds: Normal heart sounds. Pulmonary:      Effort: Pulmonary effort is normal.      Breath sounds: Normal breath sounds. Abdominal:      General: Bowel sounds are normal.      Palpations: Abdomen is soft. Musculoskeletal:         General: Normal range of motion. Cervical back: Normal range of motion and neck supple. Skin:     General: Skin is warm and dry. Neurological:      General: No focal deficit present. Mental Status: She is alert and oriented to person, place, and time.    Psychiatric:         Mood and Affect: Mood normal.

## 2022-10-17 DIAGNOSIS — E03.9 HYPOTHYROIDISM, UNSPECIFIED TYPE: ICD-10-CM

## 2022-10-17 LAB
T4 FREE: 1.45 NG/DL (ref 0.84–1.68)
TSH SERPL DL<=0.05 MIU/L-ACNC: 0.07 UIU/ML (ref 0.44–3.86)

## 2022-10-18 ENCOUNTER — OFFICE VISIT (OUTPATIENT)
Dept: ENDOCRINOLOGY | Age: 37
End: 2022-10-18
Payer: COMMERCIAL

## 2022-10-18 VITALS
OXYGEN SATURATION: 98 % | WEIGHT: 211 LBS | HEART RATE: 69 BPM | HEIGHT: 65 IN | SYSTOLIC BLOOD PRESSURE: 122 MMHG | DIASTOLIC BLOOD PRESSURE: 81 MMHG | BODY MASS INDEX: 35.16 KG/M2

## 2022-10-18 DIAGNOSIS — E03.9 HYPOTHYROIDISM, UNSPECIFIED TYPE: Primary | ICD-10-CM

## 2022-10-18 DIAGNOSIS — B00.9 HERPES SIMPLEX: ICD-10-CM

## 2022-10-18 PROCEDURE — 99214 OFFICE O/P EST MOD 30 MIN: CPT | Performed by: PHYSICIAN ASSISTANT

## 2022-10-18 RX ORDER — LEVOTHYROXINE SODIUM 150 MCG
150 TABLET ORAL DAILY
Qty: 30 TABLET | Refills: 3 | Status: SHIPPED | OUTPATIENT
Start: 2022-10-18

## 2022-10-18 RX ORDER — LEVOTHYROXINE SODIUM 175 MCG
TABLET ORAL
Qty: 60 TABLET | Refills: 5 | Status: SHIPPED | OUTPATIENT
Start: 2022-10-18

## 2022-10-18 RX ORDER — VALACYCLOVIR HYDROCHLORIDE 500 MG/1
500 TABLET, FILM COATED ORAL DAILY PRN
Qty: 30 TABLET | Refills: 3 | Status: SHIPPED | OUTPATIENT
Start: 2022-10-18

## 2022-10-18 ASSESSMENT — ENCOUNTER SYMPTOMS
NAUSEA: 0
ABDOMINAL PAIN: 0
EYE REDNESS: 0
WHEEZING: 0
COUGH: 0
SORE THROAT: 0
DIARRHEA: 0
SHORTNESS OF BREATH: 0
SINUS PRESSURE: 0
RHINORRHEA: 0
VOMITING: 0
EYE PAIN: 0

## 2022-10-18 NOTE — PROGRESS NOTES
thyroid labs for the last few years. She has been experiencing difficulty sleeping, and difficulty losing weight. Previous endocrinologist tried her on Cytomel, transitioned her to [place] Thyroid, and then she was placed back on levothyroxine. Her recent TSH is suppressed, I am making adjustments to her dosing regiment slowly and will monitor serial thyroid labs accordingly. I may consider adding Cytomel to her regiment based on her free T3 levels once her TSH is in normal range. No past medical history on file. No past surgical history on file. Social History     Socioeconomic History    Marital status:      Spouse name: Not on file    Number of children: Not on file    Years of education: Not on file    Highest education level: Not on file   Occupational History    Not on file   Tobacco Use    Smoking status: Never    Smokeless tobacco: Never   Substance and Sexual Activity    Alcohol use: Not on file    Drug use: Not on file    Sexual activity: Not on file   Other Topics Concern    Not on file   Social History Narrative    Not on file     Social Determinants of Health     Financial Resource Strain: Not on file   Food Insecurity: Not on file   Transportation Needs: Not on file   Physical Activity: Not on file   Stress: Not on file   Social Connections: Not on file   Intimate Partner Violence: Not on file   Housing Stability: Not on file     No family history on file.   Allergies   Allergen Reactions    Macrobid [Nitrofurantoin] Other (See Comments)       Current Outpatient Medications:     valACYclovir (VALTREX) 500 MG tablet, Take 1 tablet by mouth daily as needed (cold sores) Take 500 mg by mouth daily as needed, Disp: 30 tablet, Rfl: 3    SYNTHROID 150 MCG tablet, Take 1 tablet by mouth Daily Take on tablet Friday, Saturday, Sunday, Disp: 30 tablet, Rfl: 3    SYNTHROID 175 MCG tablet, TAKE 1 TABLET BY MOUTH Monday-Thursday , ONLY  LEV, Disp: 60 tablet, Rfl: 5    aluminum chloride (DRYSOL) 20 % vomiting. Genitourinary:  Negative for difficulty urinating. Musculoskeletal:  Negative for arthralgias. Skin:  Negative for rash. Neurological:  Negative for dizziness and headaches. Objective:   Physical Exam  Constitutional:       Appearance: Normal appearance. She is well-developed. She is not ill-appearing. HENT:      Head: Normocephalic and atraumatic. Nose: No congestion. Eyes:      Conjunctiva/sclera: Conjunctivae normal.   Neck:      Comments: No thyromegaly or palpable nodules  Cardiovascular:      Rate and Rhythm: Normal rate and regular rhythm. Heart sounds: Normal heart sounds. Pulmonary:      Effort: Pulmonary effort is normal.      Breath sounds: Normal breath sounds. Abdominal:      General: Bowel sounds are normal.      Palpations: Abdomen is soft. Musculoskeletal:         General: Normal range of motion. Cervical back: Normal range of motion and neck supple. Skin:     General: Skin is warm and dry. Neurological:      General: No focal deficit present. Mental Status: She is alert and oriented to person, place, and time.    Psychiatric:         Mood and Affect: Mood normal.

## 2022-10-20 LAB — T3 FREE: 2.94 PG/ML (ref 2.02–4.43)

## 2022-11-03 NOTE — TELEPHONE ENCOUNTER
[facility] called and faxed a denial of LEV synthroid , was told to redo PA with more dates pt tried other meds

## 2023-10-18 DIAGNOSIS — E03.9 HYPOTHYROIDISM, UNSPECIFIED TYPE: ICD-10-CM

## 2023-10-18 RX ORDER — VALACYCLOVIR HYDROCHLORIDE 500 MG/1
TABLET, FILM COATED ORAL
Qty: 30 TABLET | Refills: 3 | OUTPATIENT
Start: 2023-10-18

## 2024-08-08 ENCOUNTER — HOSPITAL ENCOUNTER (OUTPATIENT)
Dept: RADIOLOGY | Facility: EXTERNAL LOCATION | Age: 39
Discharge: HOME | End: 2024-08-08

## 2024-08-08 DIAGNOSIS — R05.9 COUGH, UNSPECIFIED TYPE: ICD-10-CM

## 2024-09-16 ENCOUNTER — APPOINTMENT (OUTPATIENT)
Dept: PRIMARY CARE | Facility: CLINIC | Age: 39
End: 2024-09-16
Payer: COMMERCIAL

## 2024-11-10 ENCOUNTER — OFFICE VISIT (OUTPATIENT)
Dept: URGENT CARE | Age: 39
End: 2024-11-10
Payer: COMMERCIAL

## 2024-11-10 VITALS
OXYGEN SATURATION: 95 % | SYSTOLIC BLOOD PRESSURE: 112 MMHG | HEART RATE: 77 BPM | BODY MASS INDEX: 32.32 KG/M2 | HEIGHT: 65 IN | TEMPERATURE: 98.8 F | WEIGHT: 194 LBS | DIASTOLIC BLOOD PRESSURE: 75 MMHG | RESPIRATION RATE: 18 BRPM

## 2024-11-10 DIAGNOSIS — R20.2 FACIAL PARESTHESIA: Primary | ICD-10-CM

## 2024-11-10 PROCEDURE — 3008F BODY MASS INDEX DOCD: CPT | Performed by: PHYSICIAN ASSISTANT

## 2024-11-10 PROCEDURE — 99213 OFFICE O/P EST LOW 20 MIN: CPT | Performed by: PHYSICIAN ASSISTANT

## 2024-11-10 RX ORDER — VALACYCLOVIR HYDROCHLORIDE 1 G/1
1000 TABLET, FILM COATED ORAL 3 TIMES DAILY
Qty: 21 TABLET | Refills: 0 | Status: SHIPPED | OUTPATIENT
Start: 2024-11-10 | End: 2024-11-17

## 2024-11-10 RX ORDER — LEVOTHYROXINE SODIUM 112 UG/1
TABLET ORAL
COMMUNITY
Start: 2024-08-29

## 2024-11-10 RX ORDER — LIOTHYRONINE SODIUM 5 UG/1
TABLET ORAL
COMMUNITY

## 2024-11-10 ASSESSMENT — PAIN SCALES - GENERAL: PAINLEVEL_OUTOF10: 4

## 2024-11-10 NOTE — PROGRESS NOTES
"Subjective   Patient ID: Cynthia Ulloa is a 39 y.o. female who presents for Other (Possible shingles on left forehead today).    HPI  Pt presents for facial paraesthesias.  Pt reports pain and tingling in the (L) V1 distribution.  No rash.  Pt is under significant stress lately.  No other precipitating effect.  Patient is concerned about possible zoster outbreak.  No other complaints.    Review of Systems    Constitutional:  See HPI   Integumentary: See HPI  Neurologic:  Alert and oriented X4, No numbness, No tingling.    All other systems are negative     Objective     /75 (BP Location: Right arm, Patient Position: Sitting)   Pulse 77   Temp 37.1 °C (98.8 °F) (Oral)   Resp 18   Ht 1.651 m (5' 5\")   Wt 88 kg (194 lb)   SpO2 95%   BMI 32.28 kg/m²     Physical Exam    General:  Alert and oriented, No acute distress.    Eye:  Pupils are equal, round and reactive to light, Normal conjunctiva.    HENT:  Normocephalic,   Neck:  Supple    Respiratory: Respirations are non-labored   Musculoskeletal: Normal ROM and strength  Integumentary:  Warm, Dry, Intact, No pallor, No rash.    Neurologic:  Alert, Oriented, Normal sensory, Cranial Nerves II-XII are grossly intact  Psychiatric:  Cooperative, Appropriate mood & affect.    Assessment/Plan   Exam is unremarkable.  Wrote for valtrex in the event that the rash does appear.  Patient's clinical presentation is otherwise unremarkable at this time. Patient is discharged with instructions to follow-up with primary care or seek emergency medical attention for worsening symptoms or any new concerns.  Next to bed is now some people need immediate fibroblastoma disseminated and treat the elevated test  Problem List Items Addressed This Visit    None  Visit Diagnoses       Facial paresthesia    -  Primary    Relevant Medications    valACYclovir (Valtrex) 1 gram tablet            Final diagnoses:   [R20.2] Facial paresthesia      "